# Patient Record
Sex: FEMALE | Race: WHITE | NOT HISPANIC OR LATINO | Employment: FULL TIME | URBAN - METROPOLITAN AREA
[De-identification: names, ages, dates, MRNs, and addresses within clinical notes are randomized per-mention and may not be internally consistent; named-entity substitution may affect disease eponyms.]

---

## 2023-12-11 ENCOUNTER — ANCILLARY PROCEDURE (OUTPATIENT)
Dept: RADIOLOGY | Facility: CLINIC | Age: 48
End: 2023-12-11
Payer: COMMERCIAL

## 2023-12-11 ENCOUNTER — OFFICE VISIT (OUTPATIENT)
Dept: ORTHOPEDIC SURGERY | Facility: CLINIC | Age: 48
End: 2023-12-11
Payer: COMMERCIAL

## 2023-12-11 DIAGNOSIS — M17.10 ARTHRITIS OF KNEE: ICD-10-CM

## 2023-12-11 DIAGNOSIS — M25.561 ACUTE PAIN OF RIGHT KNEE: ICD-10-CM

## 2023-12-11 PROCEDURE — 99213 OFFICE O/P EST LOW 20 MIN: CPT | Performed by: INTERNAL MEDICINE

## 2023-12-11 PROCEDURE — 73562 X-RAY EXAM OF KNEE 3: CPT | Mod: RT

## 2023-12-11 PROCEDURE — 99203 OFFICE O/P NEW LOW 30 MIN: CPT | Performed by: INTERNAL MEDICINE

## 2023-12-11 PROCEDURE — 73562 X-RAY EXAM OF KNEE 3: CPT | Mod: RIGHT SIDE | Performed by: INTERNAL MEDICINE

## 2023-12-11 RX ORDER — METHYLPREDNISOLONE 4 MG/1
TABLET ORAL
Qty: 1 EACH | Refills: 0 | Status: SHIPPED | OUTPATIENT
Start: 2023-12-11 | End: 2024-04-17 | Stop reason: WASHOUT

## 2023-12-11 NOTE — PROGRESS NOTES
Acute Injury New Patient Visit    CC:   Chief Complaint   Patient presents with    Right Knee - Pain       HPI: Lana is a 48 y.o. female presents today to acute injury clinic for chronic right knee pain going on for the past several weeks.  There is no fall or traumatic injury.  Gradual some pain of the right knee.  Denies any instability.  No previous surgeries.  No recent infection.  On and off pain aggravated with activities.  More painful in the day.        Review of Systems   GENERAL: Negative for malaise, significant weight loss, fever  MUSCULOSKELETAL: See HPI  NEURO:  Negative for numbness / tingling     Past Medical History  No past medical history on file.    Medication review  Medication Documentation Review Audit    **Prior to Admission medications have not yet been reviewed**         Allergies  Not on File    Social History  Social History     Socioeconomic History    Marital status:      Spouse name: Not on file    Number of children: Not on file    Years of education: Not on file    Highest education level: Not on file   Occupational History    Not on file   Tobacco Use    Smoking status: Not on file    Smokeless tobacco: Not on file   Substance and Sexual Activity    Alcohol use: Not on file    Drug use: Not on file    Sexual activity: Not on file   Other Topics Concern    Not on file   Social History Narrative    Not on file     Social Determinants of Health     Financial Resource Strain: Not on file   Food Insecurity: Not on file   Transportation Needs: Not on file   Physical Activity: Not on file   Stress: Not on file   Social Connections: Not on file   Intimate Partner Violence: Not on file   Housing Stability: Not on file       Surgical History  No past surgical history on file.    Physical Exam:  GENERAL:  Patient is awake, alert, and oriented to person place and time.  Patient appears well nourished and well kept.  Affect Calm, Not Acutely Distressed.  HEENT:  Normocephalic,  Atraumatic, EOMI  CARDIOVASCULAR:  Hemodynamically stable.  RESPIRATORY:  Normal respirations with unlabored breathing.  Extremity: Right knee shows skin is intact.  No effusion no warmth.  She can flex her right knee to 125 degrees, pain with full flexion.  Full extension at 0 degrees.  Negative valgus and varus stress test.  Negative Lachman's test.  Negative Johnson's test.  Pain over the posterior medial joint line.  Some mild pain of the lateral joint line.  No pain over the medial or lateral patellar facets.  Negative patellar apprehension's test.  She is able do a satisfactory straight leg test.  She is able to get up from her chair with no difficulties.  She is neurovasc intact.      Diagnostics: X-rays reviewed  No image results found.      Procedure: None    Assessment: Right knee osteoarthritis    Plan: Lana presents with chronic right knee pain to acute injury clinic secondary to right knee osteoarthritis, with mild arthritic changes at the medial compartment with bone spur formation.  Recommended weight loss and activity medication.  Placed on a Medrol Dosepak.  She will follow-up in 3 to 4 weeks, if she is still in some pain we may consider possible intra-articular corticosteroid junction.  Will involve the physical therapy for home PT program.    Orders Placed This Encounter    XR knee right 3 views      At the conclusion of the visit there were no further questions by the patient/family regarding their plan of care.  Patient was instructed to call or return with any issues, questions, or concerns regarding their injury and/or treatment plan described above.     12/11/23 at 8:45 AM - Yaniv Gallegos MD    Office: [phone]    This note was prepared using voice recognition software.  The details of this note are correct and have been reviewed, and corrected to the best of my ability.  Some grammatical errors may persist related to the Dragon software.

## 2024-01-09 ENCOUNTER — APPOINTMENT (OUTPATIENT)
Dept: ORTHOPEDIC SURGERY | Facility: CLINIC | Age: 49
End: 2024-01-09
Payer: COMMERCIAL

## 2024-01-15 ENCOUNTER — APPOINTMENT (OUTPATIENT)
Dept: ORTHOPEDIC SURGERY | Facility: CLINIC | Age: 49
End: 2024-01-15
Payer: COMMERCIAL

## 2024-02-12 ENCOUNTER — APPOINTMENT (OUTPATIENT)
Dept: PRIMARY CARE | Facility: CLINIC | Age: 49
End: 2024-02-12
Payer: COMMERCIAL

## 2024-03-11 ENCOUNTER — APPOINTMENT (OUTPATIENT)
Dept: PRIMARY CARE | Facility: CLINIC | Age: 49
End: 2024-03-11
Payer: COMMERCIAL

## 2024-03-18 ENCOUNTER — OFFICE VISIT (OUTPATIENT)
Dept: PRIMARY CARE | Facility: CLINIC | Age: 49
End: 2024-03-18
Payer: COMMERCIAL

## 2024-03-18 VITALS
WEIGHT: 257.72 LBS | BODY MASS INDEX: 42.94 KG/M2 | SYSTOLIC BLOOD PRESSURE: 110 MMHG | TEMPERATURE: 98 F | DIASTOLIC BLOOD PRESSURE: 64 MMHG | HEIGHT: 65 IN

## 2024-03-18 DIAGNOSIS — Z00.00 WELL ADULT EXAM: Primary | ICD-10-CM

## 2024-03-18 DIAGNOSIS — N92.0 MENORRHAGIA WITH REGULAR CYCLE: ICD-10-CM

## 2024-03-18 DIAGNOSIS — E66.9 OBESITY WITHOUT SERIOUS COMORBIDITY, UNSPECIFIED CLASSIFICATION, UNSPECIFIED OBESITY TYPE: ICD-10-CM

## 2024-03-18 DIAGNOSIS — F32.A DEPRESSION, UNSPECIFIED DEPRESSION TYPE: ICD-10-CM

## 2024-03-18 DIAGNOSIS — D64.9 ANEMIA, UNSPECIFIED TYPE: ICD-10-CM

## 2024-03-18 LAB
ALBUMIN SERPL BCP-MCNC: 4 G/DL (ref 3.4–5)
ALP SERPL-CCNC: 69 U/L (ref 33–110)
ALT SERPL W P-5'-P-CCNC: 16 U/L (ref 7–45)
ANION GAP SERPL CALC-SCNC: 11 MMOL/L (ref 10–20)
AST SERPL W P-5'-P-CCNC: 15 U/L (ref 9–39)
BILIRUB SERPL-MCNC: 0.6 MG/DL (ref 0–1.2)
BUN SERPL-MCNC: 10 MG/DL (ref 6–23)
CALCIUM SERPL-MCNC: 8.9 MG/DL (ref 8.6–10.3)
CHLORIDE SERPL-SCNC: 106 MMOL/L (ref 98–107)
CHOLEST SERPL-MCNC: 195 MG/DL (ref 0–199)
CHOLESTEROL/HDL RATIO: 4.4
CO2 SERPL-SCNC: 27 MMOL/L (ref 21–32)
CREAT SERPL-MCNC: 0.61 MG/DL (ref 0.5–1.05)
EGFRCR SERPLBLD CKD-EPI 2021: >90 ML/MIN/1.73M*2
ERYTHROCYTE [DISTWIDTH] IN BLOOD BY AUTOMATED COUNT: 14.8 % (ref 11.5–14.5)
EST. AVERAGE GLUCOSE BLD GHB EST-MCNC: 100 MG/DL
GLUCOSE SERPL-MCNC: 95 MG/DL (ref 74–99)
HBA1C MFR BLD: 5.1 %
HCT VFR BLD AUTO: 36.4 % (ref 36–46)
HDLC SERPL-MCNC: 44.5 MG/DL
HGB BLD-MCNC: 11.1 G/DL (ref 12–16)
LDLC SERPL CALC-MCNC: 112 MG/DL
MCH RBC QN AUTO: 26.1 PG (ref 26–34)
MCHC RBC AUTO-ENTMCNC: 30.5 G/DL (ref 32–36)
MCV RBC AUTO: 86 FL (ref 80–100)
NON HDL CHOLESTEROL: 151 MG/DL (ref 0–149)
NRBC BLD-RTO: 0 /100 WBCS (ref 0–0)
PLATELET # BLD AUTO: 332 X10*3/UL (ref 150–450)
POTASSIUM SERPL-SCNC: 4.8 MMOL/L (ref 3.5–5.3)
PROT SERPL-MCNC: 7 G/DL (ref 6.4–8.2)
RBC # BLD AUTO: 4.25 X10*6/UL (ref 4–5.2)
SODIUM SERPL-SCNC: 139 MMOL/L (ref 136–145)
TRIGL SERPL-MCNC: 195 MG/DL (ref 0–149)
TSH SERPL-ACNC: 2.7 MIU/L (ref 0.44–3.98)
VLDL: 39 MG/DL (ref 0–40)
WBC # BLD AUTO: 4.3 X10*3/UL (ref 4.4–11.3)

## 2024-03-18 PROCEDURE — 36415 COLL VENOUS BLD VENIPUNCTURE: CPT

## 2024-03-18 PROCEDURE — 82607 VITAMIN B-12: CPT

## 2024-03-18 PROCEDURE — 80053 COMPREHEN METABOLIC PANEL: CPT

## 2024-03-18 PROCEDURE — 84443 ASSAY THYROID STIM HORMONE: CPT

## 2024-03-18 PROCEDURE — 80061 LIPID PANEL: CPT

## 2024-03-18 PROCEDURE — 83550 IRON BINDING TEST: CPT

## 2024-03-18 PROCEDURE — 83036 HEMOGLOBIN GLYCOSYLATED A1C: CPT

## 2024-03-18 PROCEDURE — 1036F TOBACCO NON-USER: CPT | Performed by: FAMILY MEDICINE

## 2024-03-18 PROCEDURE — 85027 COMPLETE CBC AUTOMATED: CPT

## 2024-03-18 PROCEDURE — 83540 ASSAY OF IRON: CPT

## 2024-03-18 PROCEDURE — 99396 PREV VISIT EST AGE 40-64: CPT | Performed by: FAMILY MEDICINE

## 2024-03-18 RX ORDER — ESCITALOPRAM OXALATE 10 MG/1
10 TABLET ORAL
COMMUNITY
Start: 2024-02-21 | End: 2024-03-18 | Stop reason: WASHOUT

## 2024-03-18 RX ORDER — ESCITALOPRAM OXALATE 5 MG/1
5 TABLET ORAL DAILY
Qty: 30 TABLET | Refills: 2 | Status: SHIPPED | OUTPATIENT
Start: 2024-03-18 | End: 2024-06-16

## 2024-03-18 NOTE — PROGRESS NOTES
"Subjective   Patient ID: Phyllis Osei is a 48 y.o. female who presents for Annual Exam.    Pt presents to establish are and for an annual exam      Weight issues x 15 years  No regular exercise  Eats healthy     P Med Hx: anxiety/depression  Her  passed away 15 years ago  15 year old twins, fertility issues   Right Knee pain- she is in PT x 2 months    Wrist fracture 3rd grade        P Surg Hx: endometriosis, lap surgery    HM: C scope 2023, repeat in 5 years   Mammogram: 2023  Has been awhile since her last PAP, she has an OB  Last dental visit: last month  Annual vision exam: UTD        Soc Hx: caffeine: coffee daily  Rare alcohol  No tobacco               Review of Systems    Objective   /64 (BP Location: Right arm, Patient Position: Sitting)   Temp 36.7 °C (98 °F)   Ht 1.651 m (5' 5\")   Wt 117 kg (257 lb 11.5 oz)   BMI 42.89 kg/m²     Physical Exam  Vitals and nursing note reviewed.   Constitutional:       Appearance: Normal appearance.   HENT:      Right Ear: Tympanic membrane, ear canal and external ear normal.      Left Ear: Tympanic membrane, ear canal and external ear normal.      Mouth/Throat:      Mouth: Mucous membranes are moist.   Eyes:      Extraocular Movements: Extraocular movements intact.      Conjunctiva/sclera: Conjunctivae normal.      Pupils: Pupils are equal, round, and reactive to light.   Cardiovascular:      Rate and Rhythm: Normal rate and regular rhythm.      Heart sounds: Normal heart sounds.   Pulmonary:      Effort: Pulmonary effort is normal.      Breath sounds: Normal breath sounds.   Abdominal:      General: Bowel sounds are normal.      Palpations: Abdomen is soft.   Skin:     General: Skin is warm and dry.   Neurological:      General: No focal deficit present.      Mental Status: She is alert and oriented to person, place, and time.   Psychiatric:         Mood and Affect: Mood normal.         Behavior: Behavior normal.         Thought Content: Thought content " normal.         Judgment: Judgment normal.       Pt presents to establish care, P Med Hx, P Surg Hx, Fam Hx, Meds and Allegries all reviewed      Assessment/Plan   Diagnoses and all orders for this visit:  Well adult exam  Depression, unspecified depression type  -     escitalopram (Lexapro) 5 mg tablet; Take 1 tablet (5 mg) by mouth once daily.  Obesity without serious comorbidity, unspecified classification, unspecified obesity type  -     Comprehensive metabolic panel  -     Tsh With Reflex To Free T4 If Abnormal  -     Hemoglobin A1c  -     Lipid Panel  -     Referral to Nutrition Services; Future  -     Referral to Clinical Pharmacy; Future  Menorrhagia with regular cycle  -     CBC    Follow up appt in 6 weeks      Mirella Iverson, DO

## 2024-03-19 ENCOUNTER — TELEPHONE (OUTPATIENT)
Dept: PRIMARY CARE | Facility: CLINIC | Age: 49
End: 2024-03-19
Payer: COMMERCIAL

## 2024-03-19 DIAGNOSIS — D64.9 ANEMIA, UNSPECIFIED TYPE: Primary | ICD-10-CM

## 2024-03-19 LAB
IRON SATN MFR SERPL: 17 % (ref 25–45)
IRON SERPL-MCNC: 66 UG/DL (ref 35–150)
TIBC SERPL-MCNC: 392 UG/DL (ref 240–445)
UIBC SERPL-MCNC: 326 UG/DL (ref 110–370)
VIT B12 SERPL-MCNC: 307 PG/ML (ref 211–911)

## 2024-03-19 NOTE — TELEPHONE ENCOUNTER
Please notify the pt that her lab results show anemia, I would like her to have some further testing for iron, I am going to add these to the labs she had drawn, needs an anemia follow up appt in a few weeks,      Thank you,  Mirella Iverson, DO

## 2024-04-15 ENCOUNTER — APPOINTMENT (OUTPATIENT)
Dept: PRIMARY CARE | Facility: CLINIC | Age: 49
End: 2024-04-15
Payer: COMMERCIAL

## 2024-04-17 ENCOUNTER — OFFICE VISIT (OUTPATIENT)
Dept: PRIMARY CARE | Facility: CLINIC | Age: 49
End: 2024-04-17
Payer: COMMERCIAL

## 2024-04-17 VITALS
DIASTOLIC BLOOD PRESSURE: 70 MMHG | TEMPERATURE: 97.4 F | SYSTOLIC BLOOD PRESSURE: 114 MMHG | BODY MASS INDEX: 43.27 KG/M2 | WEIGHT: 260 LBS

## 2024-04-17 DIAGNOSIS — D64.9 ANEMIA, UNSPECIFIED TYPE: Primary | ICD-10-CM

## 2024-04-17 PROCEDURE — 99213 OFFICE O/P EST LOW 20 MIN: CPT | Performed by: FAMILY MEDICINE

## 2024-04-17 PROCEDURE — 1036F TOBACCO NON-USER: CPT | Performed by: FAMILY MEDICINE

## 2024-04-17 ASSESSMENT — ENCOUNTER SYMPTOMS: FATIGUE: 1

## 2024-04-17 NOTE — PROGRESS NOTES
Subjective   Patient ID: Phyllis Osei is a 48 y.o. female who presents for Follow-up (Anemia ).    Pt presents for follow up:     Anemia  Menses are heavy   They are now further apart  Super plus tampon and a pad            Review of Systems   Constitutional:  Positive for fatigue.       Objective   /70 (BP Location: Right arm, Patient Position: Sitting)   Temp 36.3 °C (97.4 °F)   Wt 118 kg (260 lb)   BMI 43.27 kg/m²     Physical Exam  Vitals and nursing note reviewed.   Constitutional:       Appearance: Normal appearance.   Abdominal:      General: Bowel sounds are normal. There is no distension.      Palpations: Abdomen is soft. There is no mass.      Tenderness: There is no abdominal tenderness. There is no guarding or rebound.      Hernia: No hernia is present.   Neurological:      Mental Status: She is alert.         Assessment/Plan   Diagnoses and all orders for this visit:  Anemia, unspecified type  -     US pelvis; Future  -     CBC; Future       Start OTC Ferrous Sulfate 325 mg daily  Re check CBC in 4 weeks  Schedule OB GYN appt   Advised pt to call sooner with any questions or concerns    Mirella Iverson,

## 2024-04-29 ENCOUNTER — HOSPITAL ENCOUNTER (OUTPATIENT)
Dept: RADIOLOGY | Facility: CLINIC | Age: 49
Discharge: HOME | End: 2024-04-29
Payer: COMMERCIAL

## 2024-04-29 DIAGNOSIS — D64.9 ANEMIA, UNSPECIFIED TYPE: ICD-10-CM

## 2024-04-29 PROCEDURE — 76856 US EXAM PELVIC COMPLETE: CPT

## 2024-04-29 PROCEDURE — 76856 US EXAM PELVIC COMPLETE: CPT | Performed by: RADIOLOGY

## 2024-04-29 PROCEDURE — 76830 TRANSVAGINAL US NON-OB: CPT | Performed by: RADIOLOGY

## 2024-05-01 ENCOUNTER — TELEPHONE (OUTPATIENT)
Dept: PRIMARY CARE | Facility: CLINIC | Age: 49
End: 2024-05-01
Payer: COMMERCIAL

## 2024-05-01 NOTE — TELEPHONE ENCOUNTER
Please notify the pt that her pelvic US does show a single fibroid, please be sure she has followed up with GYN,  Thank you,  Mirella Iverson, DO

## 2024-05-13 ENCOUNTER — APPOINTMENT (OUTPATIENT)
Dept: PRIMARY CARE | Facility: CLINIC | Age: 49
End: 2024-05-13
Payer: COMMERCIAL

## 2024-06-27 DIAGNOSIS — F32.A DEPRESSION, UNSPECIFIED DEPRESSION TYPE: ICD-10-CM

## 2024-06-27 RX ORDER — ESCITALOPRAM OXALATE 5 MG/1
5 TABLET ORAL DAILY
Qty: 30 TABLET | Refills: 0 | Status: SHIPPED | OUTPATIENT
Start: 2024-06-27

## 2024-07-31 ENCOUNTER — TELEPHONE (OUTPATIENT)
Dept: PRIMARY CARE | Facility: CLINIC | Age: 49
End: 2024-07-31
Payer: COMMERCIAL

## 2024-07-31 DIAGNOSIS — F32.A DEPRESSION, UNSPECIFIED DEPRESSION TYPE: ICD-10-CM

## 2024-07-31 NOTE — TELEPHONE ENCOUNTER
Former KR Pt switching to Della. Pt only has 1 pill left pt thought she had refills still. Pt is asking if this can be sent today.     REFILL  MEDICATION:     Escitalopram 5 MG; Take one tablet once daily.    PHARM: [facility]   PHARM NUMBER: [phone]    LR: 6/27/24       30 tablets with 0 refills   LV: 4/17/24  NV: 9/16/24

## 2024-08-01 RX ORDER — ESCITALOPRAM OXALATE 5 MG/1
5 TABLET ORAL DAILY
Qty: 30 TABLET | Refills: 1 | Status: SHIPPED | OUTPATIENT
Start: 2024-08-01

## 2024-09-12 PROBLEM — Z86.2 HISTORY OF IRON DEFICIENCY ANEMIA: Status: ACTIVE | Noted: 2023-01-23

## 2024-09-12 PROBLEM — M25.569 KNEE PAIN: Status: ACTIVE | Noted: 2024-09-12

## 2024-09-12 PROBLEM — E66.01 OBESITY, CLASS III, BMI 40-49.9 (MORBID OBESITY) (MULTI): Status: ACTIVE | Noted: 2019-05-13

## 2024-09-12 PROBLEM — F32.A DEPRESSIVE DISORDER: Status: ACTIVE | Noted: 2024-09-12

## 2024-09-12 PROBLEM — F41.9 ANXIETY: Status: ACTIVE | Noted: 2020-09-14

## 2024-09-12 PROBLEM — N92.0 MENORRHAGIA: Status: ACTIVE | Noted: 2024-09-12

## 2024-09-12 PROBLEM — D64.9 ANEMIA: Status: ACTIVE | Noted: 2024-09-12

## 2024-09-12 PROBLEM — E55.9 VITAMIN D DEFICIENCY: Status: ACTIVE | Noted: 2024-09-12

## 2024-09-12 PROBLEM — M17.10 ARTHRITIS OF KNEE: Status: ACTIVE | Noted: 2024-09-12

## 2024-09-16 ENCOUNTER — APPOINTMENT (OUTPATIENT)
Dept: PRIMARY CARE | Facility: CLINIC | Age: 49
End: 2024-09-16
Payer: COMMERCIAL

## 2024-09-16 VITALS
BODY MASS INDEX: 42.65 KG/M2 | HEART RATE: 96 BPM | HEIGHT: 65 IN | DIASTOLIC BLOOD PRESSURE: 62 MMHG | OXYGEN SATURATION: 97 % | SYSTOLIC BLOOD PRESSURE: 118 MMHG | WEIGHT: 256 LBS

## 2024-09-16 DIAGNOSIS — F41.9 ANXIETY: ICD-10-CM

## 2024-09-16 DIAGNOSIS — Z11.59 NEED FOR HEPATITIS C SCREENING TEST: ICD-10-CM

## 2024-09-16 DIAGNOSIS — F34.1 PERSISTENT DEPRESSIVE DISORDER: ICD-10-CM

## 2024-09-16 DIAGNOSIS — Z76.89 ENCOUNTER TO ESTABLISH CARE: Primary | ICD-10-CM

## 2024-09-16 DIAGNOSIS — E78.5 ELEVATED LIPIDS: ICD-10-CM

## 2024-09-16 DIAGNOSIS — D50.9 IRON DEFICIENCY ANEMIA, UNSPECIFIED IRON DEFICIENCY ANEMIA TYPE: ICD-10-CM

## 2024-09-16 DIAGNOSIS — Z12.31 ENCOUNTER FOR SCREENING MAMMOGRAM FOR MALIGNANT NEOPLASM OF BREAST: ICD-10-CM

## 2024-09-16 LAB
ALBUMIN SERPL BCP-MCNC: 4.3 G/DL (ref 3.4–5)
ALP SERPL-CCNC: 75 U/L (ref 33–110)
ALT SERPL W P-5'-P-CCNC: 17 U/L (ref 7–45)
ANION GAP SERPL CALC-SCNC: 12 MMOL/L (ref 10–20)
AST SERPL W P-5'-P-CCNC: 15 U/L (ref 9–39)
BILIRUB SERPL-MCNC: 0.6 MG/DL (ref 0–1.2)
BUN SERPL-MCNC: 12 MG/DL (ref 6–23)
CALCIUM SERPL-MCNC: 9.5 MG/DL (ref 8.6–10.3)
CHLORIDE SERPL-SCNC: 104 MMOL/L (ref 98–107)
CHOLEST SERPL-MCNC: 211 MG/DL (ref 0–199)
CHOLESTEROL/HDL RATIO: 4.3
CO2 SERPL-SCNC: 27 MMOL/L (ref 21–32)
CREAT SERPL-MCNC: 0.7 MG/DL (ref 0.5–1.05)
EGFRCR SERPLBLD CKD-EPI 2021: >90 ML/MIN/1.73M*2
ERYTHROCYTE [DISTWIDTH] IN BLOOD BY AUTOMATED COUNT: 14.9 % (ref 11.5–14.5)
GLUCOSE SERPL-MCNC: 80 MG/DL (ref 74–99)
HCT VFR BLD AUTO: 38.5 % (ref 36–46)
HDLC SERPL-MCNC: 48.9 MG/DL
HGB BLD-MCNC: 12 G/DL (ref 12–16)
LDLC SERPL CALC-MCNC: 129 MG/DL
MCH RBC QN AUTO: 26.7 PG (ref 26–34)
MCHC RBC AUTO-ENTMCNC: 31.2 G/DL (ref 32–36)
MCV RBC AUTO: 86 FL (ref 80–100)
NON HDL CHOLESTEROL: 162 MG/DL (ref 0–149)
NRBC BLD-RTO: 0 /100 WBCS (ref 0–0)
PLATELET # BLD AUTO: 391 X10*3/UL (ref 150–450)
POTASSIUM SERPL-SCNC: 5.1 MMOL/L (ref 3.5–5.3)
PROT SERPL-MCNC: 7.4 G/DL (ref 6.4–8.2)
RBC # BLD AUTO: 4.49 X10*6/UL (ref 4–5.2)
SODIUM SERPL-SCNC: 138 MMOL/L (ref 136–145)
TRIGL SERPL-MCNC: 167 MG/DL (ref 0–149)
VLDL: 33 MG/DL (ref 0–40)
WBC # BLD AUTO: 4.7 X10*3/UL (ref 4.4–11.3)

## 2024-09-16 PROCEDURE — 86803 HEPATITIS C AB TEST: CPT

## 2024-09-16 PROCEDURE — 80061 LIPID PANEL: CPT

## 2024-09-16 PROCEDURE — 85027 COMPLETE CBC AUTOMATED: CPT

## 2024-09-16 PROCEDURE — 80053 COMPREHEN METABOLIC PANEL: CPT

## 2024-09-16 PROCEDURE — 99214 OFFICE O/P EST MOD 30 MIN: CPT | Performed by: NURSE PRACTITIONER

## 2024-09-16 PROCEDURE — 1036F TOBACCO NON-USER: CPT | Performed by: NURSE PRACTITIONER

## 2024-09-16 PROCEDURE — 3008F BODY MASS INDEX DOCD: CPT | Performed by: NURSE PRACTITIONER

## 2024-09-16 RX ORDER — ESCITALOPRAM OXALATE 5 MG/1
5 TABLET ORAL DAILY
Qty: 90 TABLET | Refills: 0 | Status: SHIPPED | OUTPATIENT
Start: 2024-09-16

## 2024-09-16 ASSESSMENT — PATIENT HEALTH QUESTIONNAIRE - PHQ9
2. FEELING DOWN, DEPRESSED OR HOPELESS: NOT AT ALL
1. LITTLE INTEREST OR PLEASURE IN DOING THINGS: NOT AT ALL
SUM OF ALL RESPONSES TO PHQ9 QUESTIONS 1 & 2: 0

## 2024-09-16 NOTE — PROGRESS NOTES
General Medical Management Note    48 y.o. female presents for Medical Management  HPI    Denies recent hospitalizations, surgeries or ER visits    Diet:   healthy    Water per day:  120 ounces   Exercise: 2 times a week, job is physical   Alcohol: 1 time a month   Tobacco: denies   Mammogram:  10/30/23 [facility]  Pap and Pelvic: Margarito Kan CNP 8/24   Colonoscopy:  11/21/23 [facility] Dr Magali Jose Repeat 2028  Cologuard:     Depression and Anxiety:   Med: Lexapro 5 mg   Would like to wean   Has been on for 15 years when  passed away     Heavy menses:   4/24 ultrasound showed uterine fibroid     History reviewed. No pertinent past medical history.   Past Surgical History:   Procedure Laterality Date    COLONOSCOPY  11/2023    [facility]    ENDOMETRIAL CRYOABLATION       Family History   Problem Relation Name Age of Onset    Heart disease Father  48    Other (CABG) Father      Prostate cancer Brother      Colon cancer Paternal Grandmother        Social History     Socioeconomic History    Marital status:      Spouse name: Not on file    Number of children: 2    Years of education: Not on file    Highest education level: Not on file   Occupational History    Not on file   Tobacco Use    Smoking status: Never     Passive exposure: Never    Smokeless tobacco: Never   Substance and Sexual Activity    Alcohol use: Not on file    Drug use: Not on file    Sexual activity: Not on file   Other Topics Concern    Not on file   Social History Narrative    Not on file     Social Determinants of Health     Financial Resource Strain: Not on file   Food Insecurity: No Food Insecurity (1/22/2023)    Received from [facility]    Hunger Vital Sign     Worried About Running Out of Food in the Last Year: Never true     Ran Out of Food in the Last Year: Never true   Transportation Needs: No Transportation Needs (1/22/2023)    Received from [facility]    PRAPARE - Transportation     Lack of  "Transportation (Medical): No     Lack of Transportation (Non-Medical): No   Physical Activity: Insufficiently Active (1/22/2023)    Received from [facility]    Exercise Vital Sign     Days of Exercise per Week: 1 day     Minutes of Exercise per Session: 30 min   Stress: No Stress Concern Present (1/22/2023)    Received from [facility] [place] [facility] Occupational [facility] - Occupational Stress Questionnaire     Feeling of Stress : Only a little   Social Connections: Socially Isolated (1/22/2023)    Received from [facility]    Social Connection and Isolation Panel [NHANES]     Frequency of Communication with Friends and Family: Three times a week     Frequency of Social Gatherings with Friends and Family: Once a week     Attends Synagogue Services: Never     Active Member of Clubs or Organizations: No     Attends Club or Organization Meetings: Never     Marital Status:    Intimate Partner Violence: Not on file   Housing Stability: Unknown (1/22/2023)    Received from [facility]    Housing Stability Vital Sign     Unable to Pay for Housing in the Last Year: No     Number of Places Lived in the Last Year: 1     Unstable Housing in the Last Year: Not on file       Current Outpatient Medications on File Prior to Visit   Medication Sig Dispense Refill    escitalopram (Lexapro) 5 mg tablet Take 1 tablet (5 mg) by mouth once daily. 30 tablet 1     No current facility-administered medications on file prior to visit.       Allergies   Allergen Reactions    Bee Venom Protein (Honey Bee) Swelling     Localized swelling    Cefdinir Hives    Erythromycin Other    Eszopiclone Hives     groggy next day    Doxycycline Rash    Penicillins Rash       Visit Vitals  /62   Pulse 96   Ht 1.651 m (5' 5\")   Wt 116 kg (256 lb)   SpO2 97%   BMI 42.60 kg/m²   Smoking Status Never   BSA 2.31 m²        PHYSICAL EXAM:  Alert and " oriented x3.  Eyes: EOM grossly intact  Neck supple without carotid bruit or lymph adenopathy.    Heart regular rate and rhythm without murmur.  Lungs clear to auscultation.  Legs without edema.  Gait is non-antalgic  Speech clear.  Hearing adequate.          DIAGNOSIS/PLAN:    1. Encounter to establish care    2. Elevated lipids  - Comprehensive metabolic panel; Future  - Lipid panel; Future    3. Iron deficiency anemia, unspecified iron deficiency anemia type  - CBC; Future  - Comprehensive metabolic panel; Future    4. Need for hepatitis C screening test  - Hepatitis C antibody; Future      5. Anxiety  Stable.   Aware at any time if thoughts of suicide or homicide are present contact medical services immediately.  Plan is to slowly wean off of med.   - escitalopram (Lexapro) 5 mg tablet; Take 1 tablet (5 mg) by mouth once daily.  Dispense: 90 tablet; Refill: 0    6. Persistent depressive disorder  Stable.   Aware at any time if thoughts of suicide or homicide are present contact medical services immediately.  Plan is to slowly wean off of med.   - escitalopram (Lexapro) 5 mg tablet; Take 1 tablet (5 mg) by mouth once daily.  Dispense: 90 tablet; Refill: 0    7. Encounter for screening mammogram for malignant neoplasm of breast    - BI mammo bilateral screening tomosynthesis; Future      Medications refills will be completed as discussed.     Any labs or testing that is ordered will be reviewed and the results will be in your chart .   You can review these via  [employer].     Follow up six months for complete physical exam.    Prescriptions will not be filled unless you are compliant with your follow-up appointments or have a follow-up appointment scheduled as per the instruction of your provider. Refills for medications should be requested at the time of your office visit.     Please allow one week for refill requests to be completed.     Contact office with any questions or concerns.   Preferred communication is  via  [employer]  Please contact [email or web address] if having issues with  MyChart    [facility]  [id number], [address]  [place] [address]  Phone: [phone]    **Charting was completed using voice recognition technology and may include unintended errors**

## 2024-09-17 LAB — HCV AB SER QL: NONREACTIVE

## 2024-11-04 ENCOUNTER — HOSPITAL ENCOUNTER (OUTPATIENT)
Dept: RADIOLOGY | Facility: HOSPITAL | Age: 49
Discharge: HOME | End: 2024-11-04
Payer: COMMERCIAL

## 2024-11-04 VITALS — HEIGHT: 65 IN | WEIGHT: 250 LBS | BODY MASS INDEX: 41.65 KG/M2

## 2024-11-04 DIAGNOSIS — Z12.31 ENCOUNTER FOR SCREENING MAMMOGRAM FOR MALIGNANT NEOPLASM OF BREAST: ICD-10-CM

## 2024-11-04 PROCEDURE — 77067 SCR MAMMO BI INCL CAD: CPT

## 2024-11-11 ENCOUNTER — HOSPITAL ENCOUNTER (OUTPATIENT)
Dept: RADIOLOGY | Facility: EXTERNAL LOCATION | Age: 49
Discharge: HOME | End: 2024-11-11
Payer: COMMERCIAL

## 2025-01-16 ENCOUNTER — PATIENT MESSAGE (OUTPATIENT)
Dept: PRIMARY CARE | Facility: CLINIC | Age: 50
End: 2025-01-16
Payer: COMMERCIAL

## 2025-01-16 DIAGNOSIS — F41.9 ANXIETY: ICD-10-CM

## 2025-01-16 DIAGNOSIS — F34.1 PERSISTENT DEPRESSIVE DISORDER: ICD-10-CM

## 2025-01-17 RX ORDER — ESCITALOPRAM OXALATE 5 MG/1
5 TABLET ORAL DAILY
Qty: 90 TABLET | Refills: 3 | Status: SHIPPED | OUTPATIENT
Start: 2025-01-17

## 2025-02-20 ENCOUNTER — TELEPHONE (OUTPATIENT)
Dept: DERMATOLOGY | Facility: CLINIC | Age: 50
End: 2025-02-20
Payer: COMMERCIAL

## 2025-02-20 NOTE — TELEPHONE ENCOUNTER
Pt LM requesting appt with RP for facial redness.    Returned call and scheduled pt 3/11/25 with RP.

## 2025-03-11 ENCOUNTER — APPOINTMENT (OUTPATIENT)
Dept: DERMATOLOGY | Facility: CLINIC | Age: 50
End: 2025-03-11
Payer: COMMERCIAL

## 2025-03-11 DIAGNOSIS — L25.9 CONTACT DERMATITIS, UNSPECIFIED CONTACT DERMATITIS TYPE, UNSPECIFIED TRIGGER: Primary | ICD-10-CM

## 2025-03-11 DIAGNOSIS — L91.8 SKIN TAG: ICD-10-CM

## 2025-03-11 PROCEDURE — 99203 OFFICE O/P NEW LOW 30 MIN: CPT | Performed by: DERMATOLOGY

## 2025-03-11 RX ORDER — TRIAMCINOLONE ACETONIDE 1 MG/G
OINTMENT TOPICAL
Qty: 30 G | Refills: 1 | Status: SHIPPED | OUTPATIENT
Start: 2025-03-11

## 2025-03-11 NOTE — PROGRESS NOTES
Subjective     Phyllis Osei is a 49 y.o. female who presents for the following: Suspicious Skin Lesion (New - Patient has skin tag on left lateral neck. No concerns with tag).     Used [employer] organic skin tag remover to previous skin tags that worked, but she recently used it again 2 days again to a lesion on her left anterior neck that irritated her neck. States that it is itchy and tender due to the inflammation on the area.    Review of Systems:  No other skin or systemic complaints other than what is documented elsewhere in the note.    The following portions of the chart were reviewed this encounter and updated as appropriate:       Skin Cancer History  No skin cancer on file.    Specialty Problems    None    Past Medical History:  Phyllis Osei  has no past medical history on file.    Past Surgical History:  Phyllis Osei  has a past surgical history that includes Endometrial cryoablation and Colonoscopy (11/2023).    Family History:  Patient family history includes CABG in her father; Colon cancer in her paternal grandmother; Heart disease (age of onset: 48) in her father; Prostate cancer in her brother.    Social History:  Phyllis Osei  reports that she has never smoked. She has never been exposed to tobacco smoke. She has never used smokeless tobacco. No history on file for alcohol use and drug use.    Allergies:  Bee venom protein (honey bee), Cefdinir, Erythromycin, Eszopiclone, Doxycycline, and Penicillins    Current Medications / CAM's:    Current Outpatient Medications:     escitalopram (Lexapro) 5 mg tablet, Take 1 tablet (5 mg) by mouth once daily., Disp: 90 tablet, Rfl: 3    triamcinolone (Kenalog) 0.1 % ointment, Apply twice daily to the affected area of the neck for 2 weeks then weekends only., Disp: 30 g, Rfl: 1     Objective   Well appearing patient in no apparent distress; mood and affect are within normal limits.    A skin examination was performed including the face and neck.  Offered FBSE, pt declined. All findings within normal limits unless otherwise noted below.     Assessment/Plan   1. Contact dermatitis, unspecified contact dermatitis type, unspecified trigger  Left Anterior Neck  Erythematous scaly well demarcated plaque    Contact dermatitis - likely to OTC organic product that patient recently used    Allergic contact dermatitis can occur to many ingredients of topical products even if they have been used for several years as the body can become sensitized at any time.    Exposure results in rash typically 2-3 days after exposure and then lasts 4-6 weeks. An exposure as often as once per month can result in ongoing itchy rash.    - Start triamcinolone 0.1% ointment twice a day to the affected area for 2 weeks then stop.    Related Medications  triamcinolone (Kenalog) 0.1 % ointment  Apply twice daily to the affected area of the neck for 2 weeks then weekends only.    2. Skin tag  Left Anterior Neck  Inflamed fleshy, skin-colored sessile and pedunculated papule    Benign growths that most commonly occur in areas of friction and rubbing, specifically the neck, axilla, and inguinal creases. Due to the inflamed appearance of the lesion today, snip excision was performed for removal as courtesy.     Destr of lesion - Left Anterior Neck  Complexity: simple    Destruction method comment:  Scissors  Informed consent: discussed and consent obtained    Procedure prep:  Patient prepped in sterile fashion  Outcome: patient tolerated procedure well with no complications    Post-procedure details: wound care instructions given        Bassem Guzmán DO  PGY-4 Dermatology    Follow up at patient's convenience for FBSE.    I saw and evaluated the patient. I personally obtained the key and critical portions of the history and physical exam or was physically present for key and critical portions performed by the resident/fellow. I reviewed the resident/fellow's documentation and discussed the patient  with the resident/fellow. I agree with the resident/fellow's medical decision making as documented in the note.    Chelsie Matthews, DO

## 2025-03-24 ENCOUNTER — APPOINTMENT (OUTPATIENT)
Dept: PRIMARY CARE | Facility: CLINIC | Age: 50
End: 2025-03-24
Payer: COMMERCIAL

## 2025-03-24 VITALS
SYSTOLIC BLOOD PRESSURE: 110 MMHG | OXYGEN SATURATION: 97 % | HEIGHT: 65 IN | WEIGHT: 248 LBS | HEART RATE: 84 BPM | DIASTOLIC BLOOD PRESSURE: 60 MMHG | BODY MASS INDEX: 41.32 KG/M2

## 2025-03-24 DIAGNOSIS — N92.4 EXCESSIVE BLEEDING IN PREMENOPAUSAL PERIOD: ICD-10-CM

## 2025-03-24 DIAGNOSIS — Z00.00 HEALTHCARE MAINTENANCE: Primary | ICD-10-CM

## 2025-03-24 DIAGNOSIS — E55.9 VITAMIN D DEFICIENCY: ICD-10-CM

## 2025-03-24 DIAGNOSIS — F34.1 PERSISTENT DEPRESSIVE DISORDER: ICD-10-CM

## 2025-03-24 DIAGNOSIS — F41.9 ANXIETY: ICD-10-CM

## 2025-03-24 DIAGNOSIS — E66.01 OBESITY, CLASS III, BMI 40-49.9 (MORBID OBESITY) (MULTI): ICD-10-CM

## 2025-03-24 PROBLEM — M25.569 KNEE PAIN: Status: RESOLVED | Noted: 2024-09-12 | Resolved: 2025-03-24

## 2025-03-24 PROCEDURE — 99396 PREV VISIT EST AGE 40-64: CPT | Performed by: NURSE PRACTITIONER

## 2025-03-24 PROCEDURE — 1036F TOBACCO NON-USER: CPT | Performed by: NURSE PRACTITIONER

## 2025-03-24 PROCEDURE — 99214 OFFICE O/P EST MOD 30 MIN: CPT | Performed by: NURSE PRACTITIONER

## 2025-03-24 PROCEDURE — 3008F BODY MASS INDEX DOCD: CPT | Performed by: NURSE PRACTITIONER

## 2025-03-24 RX ORDER — ESCITALOPRAM OXALATE 5 MG/1
5 TABLET ORAL DAILY
Qty: 90 TABLET | Refills: 3 | Status: SHIPPED | OUTPATIENT
Start: 2025-03-24

## 2025-03-24 ASSESSMENT — PATIENT HEALTH QUESTIONNAIRE - PHQ9
SUM OF ALL RESPONSES TO PHQ9 QUESTIONS 1 & 2: 0
2. FEELING DOWN, DEPRESSED OR HOPELESS: NOT AT ALL
1. LITTLE INTEREST OR PLEASURE IN DOING THINGS: NOT AT ALL

## 2025-03-24 NOTE — PROGRESS NOTES
Annual Comprehensive Medical Exam:    49 y.o. female presents for annual comprehensive medical evaluation and preventive services screening.      Recent hospitalizations, surgeries or ER visits: denies     Diet:  healthy        Caffeine per day: 1  Water per day: 120 ounces  Exercise: 2 times a week, job is physical   Alcohol: 1 per month   Tobacco: denies  Pap/Pelvic: [facility] Dr Dayne Dunbar CNP 5/2017 scheduled for summer 2025  Mammogram: 11/4/24  DEXA:   Colonoscopy:  11/21/23 [facility] Dr Magali Jose Repeat 2028    Cologuard:    Allowed to report any questions or concerns.    Anxiety and Depression:  Med: Lexapro 5 mg   Feels stable on current dose.    Denies thoughts of suicide or homicide.  Would like to wean  Tried once but was nauseate      Heavy menses:   4/24 ultrasound showed uterine fibroid     [facility]: Dr Chelsie Matthews     Past Medical History:   Diagnosis Date    Endometriosis     Iron deficiency anemia     OA (osteoarthritis) of shoulder       Past Surgical History:   Procedure Laterality Date    ANOSCOPY      COLONOSCOPY  11/2023    [facility]    ENDOMETRIAL CRYOABLATION      TEMPOROMANDIBULAR JOINT SURGERY       Family History   Problem Relation Name Age of Onset    Fibroids Mother      Heart disease Father  48    Other (CABG) Father      No Known Problems Sister      Prostate cancer Brother      Colon cancer Paternal Grandmother        Social History     Socioeconomic History    Marital status:      Spouse name: Not on file    Number of children: 2    Years of education: Not on file    Highest education level: Not on file   Occupational History    Not on file   Tobacco Use    Smoking status: Never     Passive exposure: Never    Smokeless tobacco: Never   Substance and Sexual Activity    Alcohol use: Not on file    Drug use: Not on file    Sexual activity: Not on file   Other Topics Concern    Not on file   Social History Narrative    Not on file     Social Drivers of Health     Financial Resource Strain:  Not on file   Food Insecurity: No Food Insecurity (1/22/2023)    Received from [facility]    Hunger Vital Sign     Worried About Running Out of Food in the Last Year: Never true     Ran Out of Food in the Last Year: Never true   Transportation Needs: No Transportation Needs (1/22/2023)    Received from [facility]    PRAPARE - Transportation     Lack of Transportation (Medical): No     Lack of Transportation (Non-Medical): No   Physical Activity: Insufficiently Active (1/22/2023)    Received from [facility]    Exercise Vital Sign     Days of Exercise per Week: 1 day     Minutes of Exercise per Session: 30 min   Stress: No Stress Concern Present (1/22/2023)    Received from [facility]    [place] [employer] - Occupational Stress Questionnaire     Feeling of Stress : Only a little   Social Connections: Socially Isolated (1/22/2023)    Received from [facility]    Social Connection and Isolation Panel [NHANES]     Frequency of Communication with Friends and Family: Three times a week     Frequency of Social Gatherings with Friends and Family: Once a week     Attends Worship Services: Never     Active Member of Clubs or Organizations: No     Attends Club or Organization Meetings: Never     Marital Status:    Intimate Partner Violence: Not on file   Housing Stability: Unknown (1/22/2023)    Received from [facility]    Housing Stability Vital Sign     Unable to Pay for Housing in the Last Year: No     Number of Places Lived in the Last Year: 1     Unstable Housing in the Last Year: Not on file       Current Outpatient Medications on File Prior to Visit   Medication Sig Dispense Refill    escitalopram (Lexapro) 5 mg tablet Take 1 tablet (5 mg) by mouth once daily. 90 tablet 3    triamcinolone (Kenalog) 0.1 % ointment Apply twice daily to the affected  "area of the neck for 2 weeks then weekends only. 30 g 1     No current facility-administered medications on file prior to visit.       Allergies   Allergen Reactions    Bee Venom Protein (Honey Bee) Swelling     Localized swelling    Cefdinir Hives    Erythromycin Other    Eszopiclone Hives     groggy next day    Doxycycline Rash    Penicillins Rash         Visit Vitals  /60   Pulse 84   Ht 1.651 m (5' 5\")   Wt 112 kg (248 lb)   SpO2 97%   BMI 41.27 kg/m²   OB Status Having periods   Smoking Status Never   BSA 2.27 m²        Physical Exam  Gen: Alert and oriented x3 female in no acute distress.  HEENT: Head is normocephalic.  Neck is supple without carotid bruits  Heart: Regular rate and rhythm without murmurs.  Lungs: Clear to auscultation bilaterally.  Breast:             Deferred to Gyn  Pelvic:           Deferred to Gyn   Abdomen: Soft with normal bowel sounds.  No masses or pain to palpation.    Extremities: Good range of motion of all joints.  No significant edema. Pedal pulses +1-2/4  Neuro: No signs of focal neurologic deficit.  No tremor.  Speech and hearing are normal.  DTRs +3/4;  Muscle Strength +5/5.  Musculoskeletal: Spine with good ROM.  Leg lengths are equal.  Skin: No significant or irregular nevi visualized.  Psych: normal affect.  No suicidal ideation.  Good judgment and insight.    Diagnosis/Plan:     1. Healthcare maintenance (Primary)    - Comprehensive metabolic panel; Future  - CBC; Future  - Lipid panel; Future  - TSH with reflex to Free T4 if abnormal; Future  - Urinalysis with Reflex Microscopic; Future  - Comprehensive metabolic panel  - CBC  - Lipid panel  - TSH with reflex to Free T4 if abnormal  - Urinalysis with Reflex Microscopic    2. Anxiety  Stable.   Continue current medication/treatment plan.     Aware at any time if thoughts of suicide or homicide are present contact medical services immediately.    - escitalopram (Lexapro) 5 mg tablet; Take 1 tablet (5 mg) by mouth once " "daily.  Dispense: 90 tablet; Refill: 3    3. Persistent depressive disorder  Stable.   Continue current medication/treatment plan.     Aware at any time if thoughts of suicide or homicide are present contact medical services immediately.    - escitalopram (Lexapro) 5 mg tablet; Take 1 tablet (5 mg) by mouth once daily.  Dispense: 90 tablet; Refill: 3    4. Excessive bleeding in premenopausal period  Follow-up with specialist per their discretion/direction.     5. Vitamin D deficiency  Vitamin D lab ordered. If your level is low,  a script for a weekly dose of Vitamin D will be sent to pharmacy. You should start or continue a Multivitamin.    - Vitamin D 25-Hydroxy,Total (for eval of Vitamin D levels); Future  - Vitamin D 25-Hydroxy,Total (for eval of Vitamin D levels)    6. Obesity, Class III, BMI 40-49.9 (morbid obesity) (Multi)  Patient encouraged to follow diet of lower carbohydrates and increase vegetable and fruit intake.   Patient also encouraged to increase water intake to 80 ounces/day.   Start or continue exercise for at least 30 minutes a day on most days of the week.     offers various ways to learn about healthy eating and healthy weight loss.     Helpful websites  Www.Myplate.gov  https://diabetes.org/food-nutrition/understanding-carbs/carb-counting-and-diabetes   [employer]: resource for finding low-carb meal plans, snack lists and tons of recipes.  There is a wealth of info on the American Heart Association's website--\"Life's Essential 8\"         Medications refills will be completed as discussed.     Any labs or testing that is ordered will be reviewed and the results will be in your chart .   You can review these via  Med[employer].     Follow up 1 year for CPE    Prescriptions will not be filled unless you are compliant with your follow-up appointments or have a follow-up appointment scheduled as per the instruction of your provider. Refills for medications should be requested at the time of your " office visit.     Please allow one week for refill requests to be completed.     [employer] [facility] can help with scheduling referrals: [phone]   Mammogram [phone]  Physical Therapy [phone]    Contact office with any questions or concerns.   Preferred communication is via  [employer]  Please contact [email or web address] if having issues with  [employer]    Della Jeong [facility]  [address], [address]  [place] [address]  Phone: [phone]    **Charting was completed using voice recognition technology and may include unintended errors**

## 2025-03-25 DIAGNOSIS — E55.9 VITAMIN D DEFICIENCY: Primary | ICD-10-CM

## 2025-03-25 DIAGNOSIS — Z82.49 FAMILY HISTORY OF CORONARY ARTERY DISEASE IN FATHER: ICD-10-CM

## 2025-03-25 DIAGNOSIS — E78.5 HYPERLIPIDEMIA, UNSPECIFIED HYPERLIPIDEMIA TYPE: ICD-10-CM

## 2025-03-25 LAB
25(OH)D3+25(OH)D2 SERPL-MCNC: 20 NG/ML (ref 30–100)
ALBUMIN SERPL-MCNC: 4.1 G/DL (ref 3.6–5.1)
ALP SERPL-CCNC: 68 U/L (ref 31–125)
ALT SERPL-CCNC: 16 U/L (ref 6–29)
ANION GAP SERPL CALCULATED.4IONS-SCNC: 9 MMOL/L (CALC) (ref 7–17)
APPEARANCE UR: CLEAR
AST SERPL-CCNC: 13 U/L (ref 10–35)
BACTERIA #/AREA URNS HPF: ABNORMAL /HPF
BILIRUB SERPL-MCNC: 0.3 MG/DL (ref 0.2–1.2)
BILIRUB UR QL STRIP: NEGATIVE
BUN SERPL-MCNC: 11 MG/DL (ref 7–25)
CALCIUM SERPL-MCNC: 9.4 MG/DL (ref 8.6–10.2)
CHLORIDE SERPL-SCNC: 105 MMOL/L (ref 98–110)
CHOLEST SERPL-MCNC: 209 MG/DL
CHOLEST/HDLC SERPL: 4.8 (CALC)
CO2 SERPL-SCNC: 25 MMOL/L (ref 20–32)
COLOR UR: YELLOW
CREAT SERPL-MCNC: 0.61 MG/DL (ref 0.5–0.99)
EGFRCR SERPLBLD CKD-EPI 2021: 110 ML/MIN/1.73M2
ERYTHROCYTE [DISTWIDTH] IN BLOOD BY AUTOMATED COUNT: 14.1 % (ref 11–15)
GLUCOSE SERPL-MCNC: 95 MG/DL (ref 65–99)
GLUCOSE UR QL STRIP: NEGATIVE
HCT VFR BLD AUTO: 36.3 % (ref 35–45)
HDLC SERPL-MCNC: 44 MG/DL
HGB BLD-MCNC: 11.1 G/DL (ref 11.7–15.5)
HGB UR QL STRIP: NEGATIVE
HYALINE CASTS #/AREA URNS LPF: ABNORMAL /LPF
KETONES UR QL STRIP: ABNORMAL
LDLC SERPL CALC-MCNC: 128 MG/DL (CALC)
LEUKOCYTE ESTERASE UR QL STRIP: ABNORMAL
MCH RBC QN AUTO: 25.3 PG (ref 27–33)
MCHC RBC AUTO-ENTMCNC: 30.6 G/DL (ref 32–36)
MCV RBC AUTO: 82.9 FL (ref 80–100)
NITRITE UR QL STRIP: NEGATIVE
NONHDLC SERPL-MCNC: 165 MG/DL (CALC)
PH UR STRIP: 6.5 [PH] (ref 5–8)
PLATELET # BLD AUTO: 379 THOUSAND/UL (ref 140–400)
PMV BLD REES-ECKER: 10.4 FL (ref 7.5–12.5)
POTASSIUM SERPL-SCNC: 5.2 MMOL/L (ref 3.5–5.3)
PROT SERPL-MCNC: 7.4 G/DL (ref 6.1–8.1)
PROT UR QL STRIP: ABNORMAL
RBC # BLD AUTO: 4.38 MILLION/UL (ref 3.8–5.1)
RBC #/AREA URNS HPF: ABNORMAL /HPF
SERVICE CMNT-IMP: ABNORMAL
SODIUM SERPL-SCNC: 139 MMOL/L (ref 135–146)
SP GR UR STRIP: 1.02 (ref 1–1.03)
SQUAMOUS #/AREA URNS HPF: ABNORMAL /HPF
TRIGL SERPL-MCNC: 220 MG/DL
TSH SERPL-ACNC: 2.12 MIU/L
WBC # BLD AUTO: 5.6 THOUSAND/UL (ref 3.8–10.8)
WBC #/AREA URNS HPF: ABNORMAL /HPF

## 2025-03-25 RX ORDER — ROSUVASTATIN CALCIUM 10 MG/1
10 TABLET, COATED ORAL DAILY
Qty: 90 TABLET | Refills: 1 | Status: SHIPPED | OUTPATIENT
Start: 2025-03-25

## 2025-03-25 RX ORDER — ERGOCALCIFEROL 1.25 MG/1
50000 CAPSULE ORAL
Qty: 13 CAPSULE | Refills: 3 | Status: SHIPPED | OUTPATIENT
Start: 2025-03-25

## 2026-03-30 ENCOUNTER — APPOINTMENT (OUTPATIENT)
Dept: PRIMARY CARE | Facility: CLINIC | Age: 51
End: 2026-03-30
Payer: COMMERCIAL